# Patient Record
Sex: FEMALE | Race: WHITE | NOT HISPANIC OR LATINO | Employment: UNEMPLOYED | URBAN - METROPOLITAN AREA
[De-identification: names, ages, dates, MRNs, and addresses within clinical notes are randomized per-mention and may not be internally consistent; named-entity substitution may affect disease eponyms.]

---

## 2020-07-15 ENCOUNTER — OFFICE VISIT (OUTPATIENT)
Dept: OBSTETRICS AND GYNECOLOGY | Facility: CLINIC | Age: 45
End: 2020-07-15
Payer: COMMERCIAL

## 2020-07-15 VITALS
BODY MASS INDEX: 26.13 KG/M2 | HEIGHT: 62 IN | SYSTOLIC BLOOD PRESSURE: 121 MMHG | DIASTOLIC BLOOD PRESSURE: 72 MMHG | WEIGHT: 142 LBS

## 2020-07-15 DIAGNOSIS — Z12.31 BREAST CANCER SCREENING BY MAMMOGRAM: ICD-10-CM

## 2020-07-15 DIAGNOSIS — Z01.419 ENCOUNTER FOR WELL WOMAN EXAM WITH ROUTINE GYNECOLOGICAL EXAM: Primary | ICD-10-CM

## 2020-07-15 PROCEDURE — 3008F BODY MASS INDEX DOCD: CPT | Mod: CPTII,S$GLB,, | Performed by: OBSTETRICS & GYNECOLOGY

## 2020-07-15 PROCEDURE — 3008F PR BODY MASS INDEX (BMI) DOCUMENTED: ICD-10-PCS | Mod: CPTII,S$GLB,, | Performed by: OBSTETRICS & GYNECOLOGY

## 2020-07-15 PROCEDURE — 99396 PREV VISIT EST AGE 40-64: CPT | Mod: S$GLB,,, | Performed by: OBSTETRICS & GYNECOLOGY

## 2020-07-15 PROCEDURE — 99396 PR PREVENTIVE VISIT,EST,40-64: ICD-10-PCS | Mod: S$GLB,,, | Performed by: OBSTETRICS & GYNECOLOGY

## 2020-07-15 RX ORDER — NORGESTIMATE AND ETHINYL ESTRADIOL 0.25-0.035
KIT ORAL
COMMUNITY
Start: 2020-05-04 | End: 2020-07-15

## 2020-07-15 RX ORDER — SPIRONOLACTONE 100 MG/1
TABLET, FILM COATED ORAL
COMMUNITY
Start: 2020-05-27 | End: 2021-01-19 | Stop reason: SDUPTHER

## 2020-07-29 ENCOUNTER — TELEPHONE (OUTPATIENT)
Dept: OBSTETRICS AND GYNECOLOGY | Facility: CLINIC | Age: 45
End: 2020-07-29

## 2020-07-29 NOTE — TELEPHONE ENCOUNTER
----- Message from Yamilex Sanchez MD sent at 7/29/2020  1:31 PM CDT -----  Please let patient know her pap and hpv are normal

## 2020-07-30 ENCOUNTER — TELEPHONE (OUTPATIENT)
Dept: OBSTETRICS AND GYNECOLOGY | Facility: CLINIC | Age: 45
End: 2020-07-30

## 2020-07-30 NOTE — TELEPHONE ENCOUNTER
----- Message from Ambreen Billings sent at 7/30/2020  2:04 PM CDT -----  Type:  Patient Returning Call    Who Called:pt   Who Left Message for Patient:na  Does the patient know what this is regarding?:test results   Would the patient rather a call back or a response via [facility]? Callback   Best Call Back Number:[phone]   Additional Information:

## 2020-07-31 ENCOUNTER — TELEPHONE (OUTPATIENT)
Dept: OBSTETRICS AND GYNECOLOGY | Facility: CLINIC | Age: 45
End: 2020-07-31

## 2020-07-31 NOTE — TELEPHONE ENCOUNTER
----- Message from Noelle Garcia sent at 7/31/2020 10:02 AM CDT -----  Regarding: patient returning a call  Contact: patient  Type:  Patient Returning Call    Who Called:patient  Who Left Message for Patient:nurse  Does the patient know what this is regarding?:yes,results  Would the patient rather a call back or a response via [facility]? Call back  Best Call Back Number:[phone]   Additional Information: patient stated to leave a message if no answer

## 2020-10-09 PROBLEM — D72.829 LEUKOCYTOSIS (LEUCOCYTOSIS): Status: ACTIVE | Noted: 2020-10-09

## 2020-10-09 PROBLEM — E28.2 PCOD (POLYCYSTIC OVARIAN DISEASE): Status: ACTIVE | Noted: 2020-10-09

## 2020-10-15 ENCOUNTER — TELEPHONE (OUTPATIENT)
Dept: OBSTETRICS AND GYNECOLOGY | Facility: CLINIC | Age: 45
End: 2020-10-15

## 2020-10-15 NOTE — TELEPHONE ENCOUNTER
Spoke with patient. Two pt identifiers confirmed. Notified patient of results of normal mmg.. Patient verbalized understanding.

## 2020-10-15 NOTE — TELEPHONE ENCOUNTER
----- Message from Yamilex Sanchez MD sent at 10/13/2020  7:19 PM CDT -----  Please let patient know I reviewed her mmg report and it all looks normal

## 2020-10-15 NOTE — TELEPHONE ENCOUNTER
----- Message from Noelle Garcia sent at 10/15/2020 10:34 AM CDT -----  Regarding: patient returning a call  Contact: patient  Type:  Patient Returning Call    Who Called:patient  Who Left Message for Patient:nurse  Does the patient know what this is regarding?:yes,results  Would the patient rather a call back or a response via [facility]? Call back  Best Call Back Number:[phone]   Additional Information: n/a

## 2020-12-08 PROBLEM — E04.1 THYROID NODULE: Status: ACTIVE | Noted: 2020-12-08

## 2021-03-10 ENCOUNTER — OFFICE VISIT (OUTPATIENT)
Dept: PRIMARY CARE CLINIC | Facility: CLINIC | Age: 46
End: 2021-03-10
Payer: MEDICAID

## 2021-03-10 VITALS
WEIGHT: 151 LBS | DIASTOLIC BLOOD PRESSURE: 76 MMHG | BODY MASS INDEX: 27.79 KG/M2 | HEART RATE: 69 BPM | OXYGEN SATURATION: 99 % | SYSTOLIC BLOOD PRESSURE: 121 MMHG | TEMPERATURE: 98 F | HEIGHT: 62 IN

## 2021-03-10 DIAGNOSIS — E04.1 THYROID NODULE: ICD-10-CM

## 2021-03-10 DIAGNOSIS — Z00.00 WELLNESS EXAMINATION: ICD-10-CM

## 2021-03-10 DIAGNOSIS — D72.823 LEUKEMOID REACTION: Primary | ICD-10-CM

## 2021-03-10 DIAGNOSIS — E28.2 PCOD (POLYCYSTIC OVARIAN DISEASE): ICD-10-CM

## 2021-03-10 PROCEDURE — 99202 PR OFFICE/OUTPT VISIT, NEW, LEVL II, 15-29 MIN: ICD-10-PCS | Mod: S$GLB,,, | Performed by: INTERNAL MEDICINE

## 2021-03-10 PROCEDURE — 99202 OFFICE O/P NEW SF 15 MIN: CPT | Mod: S$GLB,,, | Performed by: INTERNAL MEDICINE

## 2021-08-04 PROBLEM — E28.2 PCOD (POLYCYSTIC OVARIAN DISEASE): Status: RESOLVED | Noted: 2020-10-09 | Resolved: 2021-08-04

## 2021-08-30 ENCOUNTER — OFFICE VISIT (OUTPATIENT)
Dept: PRIMARY CARE CLINIC | Facility: CLINIC | Age: 46
End: 2021-08-30
Payer: MEDICAID

## 2021-08-30 VITALS
OXYGEN SATURATION: 98 % | RESPIRATION RATE: 18 BRPM | SYSTOLIC BLOOD PRESSURE: 104 MMHG | DIASTOLIC BLOOD PRESSURE: 69 MMHG | HEART RATE: 74 BPM | HEIGHT: 62 IN | WEIGHT: 151 LBS | BODY MASS INDEX: 27.79 KG/M2

## 2021-08-30 DIAGNOSIS — D72.823 LEUKEMOID REACTION: Primary | ICD-10-CM

## 2021-08-30 DIAGNOSIS — E28.2 PCOD (POLYCYSTIC OVARIAN DISEASE): ICD-10-CM

## 2021-08-30 DIAGNOSIS — E04.1 THYROID NODULE: ICD-10-CM

## 2021-08-30 PROCEDURE — 99213 PR OFFICE/OUTPT VISIT, EST, LEVL III, 20-29 MIN: ICD-10-PCS | Mod: S$GLB,,, | Performed by: INTERNAL MEDICINE

## 2021-08-30 PROCEDURE — 99213 OFFICE O/P EST LOW 20 MIN: CPT | Mod: S$GLB,,, | Performed by: INTERNAL MEDICINE

## 2021-10-09 ENCOUNTER — IMMUNIZATION (OUTPATIENT)
Dept: HEMATOLOGY/ONCOLOGY | Facility: CLINIC | Age: 46
End: 2021-10-09
Payer: MEDICAID

## 2021-10-09 PROCEDURE — 90686 FLU VACCINE (QUAD) GREATER THAN OR EQUAL TO 3YO PRESERVATIVE FREE IM: ICD-10-PCS | Mod: S$GLB,,, | Performed by: FAMILY MEDICINE

## 2021-10-09 PROCEDURE — 90471 IMMUNIZATION ADMIN: CPT | Mod: S$GLB,,, | Performed by: FAMILY MEDICINE

## 2021-10-09 PROCEDURE — 90471 FLU VACCINE (QUAD) GREATER THAN OR EQUAL TO 3YO PRESERVATIVE FREE IM: ICD-10-PCS | Mod: S$GLB,,, | Performed by: FAMILY MEDICINE

## 2021-10-09 PROCEDURE — 90686 IIV4 VACC NO PRSV 0.5 ML IM: CPT | Mod: S$GLB,,, | Performed by: FAMILY MEDICINE

## 2022-02-17 ENCOUNTER — OFFICE VISIT (OUTPATIENT)
Dept: PRIMARY CARE CLINIC | Facility: CLINIC | Age: 47
End: 2022-02-17
Payer: MEDICAID

## 2022-02-17 VITALS
DIASTOLIC BLOOD PRESSURE: 79 MMHG | WEIGHT: 159.19 LBS | BODY MASS INDEX: 29.3 KG/M2 | OXYGEN SATURATION: 100 % | HEART RATE: 91 BPM | SYSTOLIC BLOOD PRESSURE: 123 MMHG | HEIGHT: 62 IN

## 2022-02-17 DIAGNOSIS — E89.0 POSTOPERATIVE HYPOTHYROIDISM: Primary | ICD-10-CM

## 2022-02-17 DIAGNOSIS — Z12.39 ENCOUNTER FOR SCREENING FOR MALIGNANT NEOPLASM OF BREAST, UNSPECIFIED SCREENING MODALITY: ICD-10-CM

## 2022-02-17 DIAGNOSIS — E87.5 HYPERKALEMIA: ICD-10-CM

## 2022-02-17 DIAGNOSIS — D72.823 LEUKEMOID REACTION: ICD-10-CM

## 2022-02-17 DIAGNOSIS — Z12.11 SCREENING FOR COLON CANCER: ICD-10-CM

## 2022-02-17 PROCEDURE — 3008F BODY MASS INDEX DOCD: CPT | Mod: CPTII,S$GLB,, | Performed by: INTERNAL MEDICINE

## 2022-02-17 PROCEDURE — 99214 PR OFFICE/OUTPT VISIT, EST, LEVL IV, 30-39 MIN: ICD-10-PCS | Mod: S$GLB,,, | Performed by: INTERNAL MEDICINE

## 2022-02-17 PROCEDURE — 3008F PR BODY MASS INDEX (BMI) DOCUMENTED: ICD-10-PCS | Mod: CPTII,S$GLB,, | Performed by: INTERNAL MEDICINE

## 2022-02-17 PROCEDURE — 99214 OFFICE O/P EST MOD 30 MIN: CPT | Mod: S$GLB,,, | Performed by: INTERNAL MEDICINE

## 2022-02-17 NOTE — PROGRESS NOTES
Subjective:      Patient ID: Alla Anguiano is a 46 y.o. female.    Chief Complaint: pt would like blood work and colon referral    HPI     Patient here for follow up. S/p right hemithyroidectomy. Needs recheck of her levels after starting levothyroxine. Denies acute complaints    Review of Systems   Constitutional: Negative for chills, fever, malaise/fatigue and weight loss.   Respiratory: Negative for cough, shortness of breath and wheezing.    Cardiovascular: Negative for chest pain, palpitations and leg swelling.   Gastrointestinal: Negative for abdominal pain, constipation, diarrhea, nausea and vomiting.   Genitourinary: Negative for dysuria, frequency and urgency.   Musculoskeletal: Negative for falls.   Neurological: Negative for dizziness, tingling and headaches.   Psychiatric/Behavioral: Negative for depression, substance abuse and suicidal ideas. The patient is not nervous/anxious.      Objective:     Physical Exam  Vitals reviewed.   Constitutional:       Appearance: Normal appearance. She is not ill-appearing.   HENT:      Head: Normocephalic.      Mouth/Throat:      Comments: Healed thyroidectomy scar  Eyes:      Extraocular Movements: Extraocular movements intact.      Conjunctiva/sclera: Conjunctivae normal.      Pupils: Pupils are equal, round, and reactive to light.   Cardiovascular:      Rate and Rhythm: Normal rate and regular rhythm.   Pulmonary:      Effort: Pulmonary effort is normal.      Breath sounds: Normal breath sounds.   Abdominal:      General: Bowel sounds are normal.   Musculoskeletal:         General: Normal range of motion.      Right lower leg: No edema.      Left lower leg: No edema.   Skin:     General: Skin is warm.      Capillary Refill: Capillary refill takes less than 2 seconds.   Neurological:      General: No focal deficit present.      Mental Status: She is alert and oriented to person, place, and time.   Psychiatric:         Mood and Affect: Mood normal.        BP  "123/79 (BP Location: Right arm, Patient Position: Sitting, BP Method: Medium (Manual))   Pulse 91   Ht 5' 2" (1.575 m)   Wt 72.2 kg (159 lb 3.2 oz)   SpO2 100%   BMI 29.12 kg/m²     Assessment:       ICD-10-CM ICD-9-CM   1. Leukemoid reaction  D72.823 288.62   2. Encounter for screening for malignant neoplasm of breast, unspecified screening modality  Z12.39 V76.10   3. Screening for colon cancer  Z12.11 V76.51   4. Postoperative hypothyroidism  E89.0 244.0   5. Hyperkalemia  E87.5 276.7       Plan:     Medication List with Changes/Refills   Current Medications    FLUCELVAX QUAD 0216-7438, PF, 60 MCG (15 MCG X 4)/0.5 ML SYRG    ADM 0.5ML IM UTD    LEVOTHYROXINE (SYNTHROID) 50 MCG TABLET    Take 1 tablet (50 mcg total) by mouth before breakfast.    MULTIVITAMIN CAPSULE    Take 1 capsule by mouth once daily.    SPIRONOLACTONE (ALDACTONE) 100 MG TABLET    Take 1 tablet (100 mg total) by mouth once daily.        Leukemoid reaction  -     CBC Auto Differential; Future; Expected date: 02/17/2022    Encounter for screening for malignant neoplasm of breast, unspecified screening modality  -     Mammo Digital Screening Bilat; Future; Expected date: 02/17/2022    Screening for colon cancer  -     Ambulatory referral/consult to General Surgery; Future; Expected date: 02/24/2022    Postoperative hypothyroidism  -     TSH; Future; Expected date: 02/17/2022  -     T4, Free; Future; Expected date: 02/17/2022    Hyperkalemia  -     Basic Metabolic Panel; Future; Expected date: 02/17/2022         Future Appointments   Date Time Provider Department Center   5/24/2022 10:20 AM Ena Villalta MD [facility]   8/30/2022  1:00 PM [facility]                      "

## 2022-02-18 LAB
BASOPHILS # BLD AUTO: 44 CELLS/UL (ref 0–200)
BASOPHILS NFR BLD AUTO: 0.4 %
BUN SERPL-MCNC: 11 MG/DL (ref 7–25)
BUN/CREAT SERPL: NORMAL (CALC) (ref 6–22)
CALCIUM SERPL-MCNC: 9.7 MG/DL (ref 8.6–10.2)
CHLORIDE SERPL-SCNC: 102 MMOL/L (ref 98–110)
CO2 SERPL-SCNC: 28 MMOL/L (ref 20–32)
CREAT SERPL-MCNC: 0.63 MG/DL (ref 0.5–1.1)
EOSINOPHIL # BLD AUTO: 100 CELLS/UL (ref 15–500)
EOSINOPHIL NFR BLD AUTO: 0.9 %
ERYTHROCYTE [DISTWIDTH] IN BLOOD BY AUTOMATED COUNT: 11.8 % (ref 11–15)
GLUCOSE SERPL-MCNC: 89 MG/DL (ref 65–99)
HCT VFR BLD AUTO: 44.7 % (ref 35–45)
HGB BLD-MCNC: 15.2 G/DL (ref 11.7–15.5)
LYMPHOCYTES # BLD AUTO: 1954 CELLS/UL (ref 850–3900)
LYMPHOCYTES NFR BLD AUTO: 17.6 %
MCH RBC QN AUTO: 30.4 PG (ref 27–33)
MCHC RBC AUTO-ENTMCNC: 34 G/DL (ref 32–36)
MCV RBC AUTO: 89.4 FL (ref 80–100)
MONOCYTES # BLD AUTO: 733 CELLS/UL (ref 200–950)
MONOCYTES NFR BLD AUTO: 6.6 %
NEUTROPHILS # BLD AUTO: 8270 CELLS/UL (ref 1500–7800)
NEUTROPHILS NFR BLD AUTO: 74.5 %
PLATELET # BLD AUTO: 271 THOUSAND/UL (ref 140–400)
PMV BLD REES-ECKER: 12.3 FL (ref 7.5–12.5)
POTASSIUM SERPL-SCNC: 4.1 MMOL/L (ref 3.5–5.3)
RBC # BLD AUTO: 5 MILLION/UL (ref 3.8–5.1)
SODIUM SERPL-SCNC: 137 MMOL/L (ref 135–146)
T4 FREE SERPL-MCNC: 1.1 NG/DL (ref 0.8–1.8)
TSH SERPL-ACNC: 5.99 MIU/L
WBC # BLD AUTO: 11.1 THOUSAND/UL (ref 3.8–10.8)

## 2022-02-21 ENCOUNTER — TELEPHONE (OUTPATIENT)
Dept: PRIMARY CARE CLINIC | Facility: CLINIC | Age: 47
End: 2022-02-21
Payer: MEDICAID

## 2022-02-21 NOTE — TELEPHONE ENCOUNTER
----- Message from Js Hein sent at 2/21/2022  9:52 AM CST -----  Contact: self  Pt would like to consult with nurse regarding recent test results and medication.  Pt would like to know if her thyroid medication need to change (dosage) or stay the same.  Please contact Alla Anguiano @ [phone].  Thanks/As

## 2022-02-22 ENCOUNTER — PATIENT MESSAGE (OUTPATIENT)
Dept: PRIMARY CARE CLINIC | Facility: CLINIC | Age: 47
End: 2022-02-22
Payer: MEDICAID

## 2022-02-24 LAB — BCS RECOMMENDATION EXT: NORMAL

## 2022-02-24 RX ORDER — LEVOTHYROXINE SODIUM 25 UG/1
25 TABLET ORAL
Qty: 30 TABLET | Refills: 11 | Status: SHIPPED | OUTPATIENT
Start: 2022-02-24 | End: 2022-04-11

## 2022-02-24 RX ORDER — LEVOTHYROXINE SODIUM 50 UG/1
50 TABLET ORAL
Qty: 90 TABLET | Refills: 3 | Status: SHIPPED | OUTPATIENT
Start: 2022-02-24 | End: 2022-04-11

## 2022-03-02 ENCOUNTER — PATIENT MESSAGE (OUTPATIENT)
Dept: PRIMARY CARE CLINIC | Facility: CLINIC | Age: 47
End: 2022-03-02
Payer: MEDICAID

## 2022-03-07 ENCOUNTER — OFFICE VISIT (OUTPATIENT)
Dept: SURGERY | Facility: CLINIC | Age: 47
End: 2022-03-07
Payer: MEDICAID

## 2022-03-07 VITALS — BODY MASS INDEX: 29.26 KG/M2 | HEIGHT: 62 IN | RESPIRATION RATE: 18 BRPM | WEIGHT: 159 LBS

## 2022-03-07 DIAGNOSIS — Z12.11 SCREENING FOR COLON CANCER: ICD-10-CM

## 2022-03-07 PROCEDURE — 3008F PR BODY MASS INDEX (BMI) DOCUMENTED: ICD-10-PCS | Mod: CPTII,S$GLB,, | Performed by: SURGERY

## 2022-03-07 PROCEDURE — 99202 OFFICE O/P NEW SF 15 MIN: CPT | Mod: S$GLB,,, | Performed by: SURGERY

## 2022-03-07 PROCEDURE — 1159F MED LIST DOCD IN RCRD: CPT | Mod: CPTII,S$GLB,, | Performed by: SURGERY

## 2022-03-07 PROCEDURE — 1160F PR REVIEW ALL MEDS BY PRESCRIBER/CLIN PHARMACIST DOCUMENTED: ICD-10-PCS | Mod: CPTII,S$GLB,, | Performed by: SURGERY

## 2022-03-07 PROCEDURE — 1159F PR MEDICATION LIST DOCUMENTED IN MEDICAL RECORD: ICD-10-PCS | Mod: CPTII,S$GLB,, | Performed by: SURGERY

## 2022-03-07 PROCEDURE — 3008F BODY MASS INDEX DOCD: CPT | Mod: CPTII,S$GLB,, | Performed by: SURGERY

## 2022-03-07 PROCEDURE — 99202 PR OFFICE/OUTPT VISIT, NEW, LEVL II, 15-29 MIN: ICD-10-PCS | Mod: S$GLB,,, | Performed by: SURGERY

## 2022-03-07 PROCEDURE — 1160F RVW MEDS BY RX/DR IN RCRD: CPT | Mod: CPTII,S$GLB,, | Performed by: SURGERY

## 2022-03-07 NOTE — PROGRESS NOTES
Patient here to discuss screening colonoscopy.  States that the she is not having any problems at this time with bleeding, constipation, or abdominal pain.  Does have a family history of her grandfather being diagnosed with colon cancer at the age of 60.  Patient has never had a colonoscopy in the past.    Risks and benefits of screening colonoscopy discussed in detail, patient will be scheduled for screening colonoscopy.

## 2022-03-18 ENCOUNTER — OUTSIDE PLACE OF SERVICE (OUTPATIENT)
Dept: SURGERY | Facility: CLINIC | Age: 47
End: 2022-03-18
Payer: MEDICAID

## 2022-03-18 LAB — CRC RECOMMENDATION EXT: NORMAL

## 2022-03-18 PROCEDURE — 45378 PR COLONOSCOPY,DIAGNOSTIC: ICD-10-PCS | Mod: ,,, | Performed by: SURGERY

## 2022-03-18 PROCEDURE — 45378 DIAGNOSTIC COLONOSCOPY: CPT | Mod: ,,, | Performed by: SURGERY

## 2022-03-21 ENCOUNTER — PATIENT OUTREACH (OUTPATIENT)
Dept: ADMINISTRATIVE | Facility: HOSPITAL | Age: 47
End: 2022-03-21
Payer: MEDICAID

## 2022-04-04 ENCOUNTER — PATIENT MESSAGE (OUTPATIENT)
Dept: PRIMARY CARE CLINIC | Facility: CLINIC | Age: 47
End: 2022-04-04
Payer: MEDICAID

## 2022-04-09 LAB
T4 FREE SERPL-MCNC: 1.2 NG/DL (ref 0.8–1.8)
TSH SERPL-ACNC: 6.44 MIU/L

## 2022-04-11 ENCOUNTER — PATIENT MESSAGE (OUTPATIENT)
Dept: PRIMARY CARE CLINIC | Facility: CLINIC | Age: 47
End: 2022-04-11
Payer: MEDICAID

## 2022-04-11 DIAGNOSIS — E89.0 POSTOPERATIVE HYPOTHYROIDISM: Primary | ICD-10-CM

## 2022-04-11 RX ORDER — LEVOTHYROXINE SODIUM 88 UG/1
88 TABLET ORAL
Qty: 30 TABLET | Refills: 11 | Status: SHIPPED | OUTPATIENT
Start: 2022-04-11 | End: 2022-06-01

## 2022-06-01 ENCOUNTER — PATIENT MESSAGE (OUTPATIENT)
Dept: PRIMARY CARE CLINIC | Facility: CLINIC | Age: 47
End: 2022-06-01
Payer: MEDICAID

## 2022-06-01 DIAGNOSIS — E89.0 POSTOPERATIVE HYPOTHYROIDISM: Primary | ICD-10-CM

## 2022-06-01 RX ORDER — LEVOTHYROXINE SODIUM 75 UG/1
75 TABLET ORAL
Qty: 30 TABLET | Refills: 11 | Status: SHIPPED | OUTPATIENT
Start: 2022-06-01 | End: 2023-05-24

## 2022-07-23 LAB
T4 FREE SERPL-MCNC: 1.2 NG/DL (ref 0.8–1.8)
TSH SERPL-ACNC: 0.45 MIU/L

## 2022-07-25 ENCOUNTER — PATIENT MESSAGE (OUTPATIENT)
Dept: PRIMARY CARE CLINIC | Facility: CLINIC | Age: 47
End: 2022-07-25
Payer: MEDICAID

## 2022-11-01 ENCOUNTER — PATIENT MESSAGE (OUTPATIENT)
Dept: PRIMARY CARE CLINIC | Facility: CLINIC | Age: 47
End: 2022-11-01
Payer: MEDICAID

## 2022-11-01 LAB
T4 FREE SERPL-MCNC: 1.3 NG/DL (ref 0.8–1.8)
TSH SERPL-ACNC: 1.59 MIU/L

## 2022-12-07 ENCOUNTER — OFFICE VISIT (OUTPATIENT)
Dept: PRIMARY CARE CLINIC | Facility: CLINIC | Age: 47
End: 2022-12-07
Payer: MEDICAID

## 2022-12-07 VITALS
BODY MASS INDEX: 27.82 KG/M2 | WEIGHT: 151.19 LBS | OXYGEN SATURATION: 100 % | HEART RATE: 73 BPM | HEIGHT: 62 IN | DIASTOLIC BLOOD PRESSURE: 82 MMHG | SYSTOLIC BLOOD PRESSURE: 130 MMHG

## 2022-12-07 DIAGNOSIS — E66.3 OVERWEIGHT (BMI 25.0-29.9): ICD-10-CM

## 2022-12-07 DIAGNOSIS — Z00.00 WELLNESS EXAMINATION: ICD-10-CM

## 2022-12-07 DIAGNOSIS — E89.0 POSTOPERATIVE HYPOTHYROIDISM: Primary | ICD-10-CM

## 2022-12-07 PROCEDURE — 3008F BODY MASS INDEX DOCD: CPT | Mod: CPTII,S$GLB,, | Performed by: INTERNAL MEDICINE

## 2022-12-07 PROCEDURE — 3079F PR MOST RECENT DIASTOLIC BLOOD PRESSURE 80-89 MM HG: ICD-10-PCS | Mod: CPTII,S$GLB,, | Performed by: INTERNAL MEDICINE

## 2022-12-07 PROCEDURE — 99213 PR OFFICE/OUTPT VISIT, EST, LEVL III, 20-29 MIN: ICD-10-PCS | Mod: S$GLB,,, | Performed by: INTERNAL MEDICINE

## 2022-12-07 PROCEDURE — 3075F SYST BP GE 130 - 139MM HG: CPT | Mod: CPTII,S$GLB,, | Performed by: INTERNAL MEDICINE

## 2022-12-07 PROCEDURE — 1159F PR MEDICATION LIST DOCUMENTED IN MEDICAL RECORD: ICD-10-PCS | Mod: CPTII,S$GLB,, | Performed by: INTERNAL MEDICINE

## 2022-12-07 PROCEDURE — 1159F MED LIST DOCD IN RCRD: CPT | Mod: CPTII,S$GLB,, | Performed by: INTERNAL MEDICINE

## 2022-12-07 PROCEDURE — 99213 OFFICE O/P EST LOW 20 MIN: CPT | Mod: S$GLB,,, | Performed by: INTERNAL MEDICINE

## 2022-12-07 PROCEDURE — 3075F PR MOST RECENT SYSTOLIC BLOOD PRESS GE 130-139MM HG: ICD-10-PCS | Mod: CPTII,S$GLB,, | Performed by: INTERNAL MEDICINE

## 2022-12-07 PROCEDURE — 3079F DIAST BP 80-89 MM HG: CPT | Mod: CPTII,S$GLB,, | Performed by: INTERNAL MEDICINE

## 2022-12-07 PROCEDURE — 3008F PR BODY MASS INDEX (BMI) DOCUMENTED: ICD-10-PCS | Mod: CPTII,S$GLB,, | Performed by: INTERNAL MEDICINE

## 2022-12-07 NOTE — PROGRESS NOTES
Subjective:      Patient ID: Alla Anguiano is a 47 y.o. female.    Chief Complaint: Annual Exam    HPI    Past Medical History:   Diagnosis Date    Allergies     Elevated WBC count     Excess body and facial hair     Nodule of right lobe of thyroid gland 2020    PCOS (polycystic ovarian syndrome)        Patient here for follow up. No acute complaints   Thyroid levels have been stable  Cycles are regular and she is going to schedule an appointment with her gynecologist. She will be due for a mammogram in Feb  She is otherwise up to date on her health maintenance      Review of Systems   Constitutional:  Negative for chills and fever.   HENT:  Negative for hearing loss.    Eyes:  Negative for blurred vision.   Respiratory:  Negative for cough, shortness of breath and wheezing.    Cardiovascular:  Negative for chest pain, palpitations and leg swelling.   Gastrointestinal:  Negative for abdominal pain, constipation, diarrhea, nausea and vomiting.   Genitourinary:  Negative for dysuria, frequency and urgency.   Musculoskeletal:  Negative for falls.   Skin:  Negative for rash.   Neurological:  Negative for dizziness and headaches.   Psychiatric/Behavioral:  Negative for depression. The patient is not nervous/anxious.    Objective:     Physical Exam  Vitals reviewed.   Constitutional:       Appearance: Normal appearance.   HENT:      Head: Normocephalic.   Eyes:      Extraocular Movements: Extraocular movements intact.      Conjunctiva/sclera: Conjunctivae normal.      Pupils: Pupils are equal, round, and reactive to light.   Cardiovascular:      Rate and Rhythm: Normal rate and regular rhythm.   Pulmonary:      Effort: Pulmonary effort is normal.      Breath sounds: Normal breath sounds.   Abdominal:      General: Bowel sounds are normal.   Skin:     General: Skin is warm.      Capillary Refill: Capillary refill takes less than 2 seconds.   Neurological:      General: No focal deficit present.      Mental Status: She  "is alert and oriented to person, place, and time.   Psychiatric:         Mood and Affect: Mood normal.     /82 (BP Location: Right arm, Patient Position: Sitting, BP Method: Medium (Automatic))   Pulse 73   Ht 5' 2" (1.575 m)   Wt 68.6 kg (151 lb 3.2 oz)   SpO2 100%   BMI 27.65 kg/m²     Assessment:       ICD-10-CM ICD-9-CM   1. Postoperative hypothyroidism  E89.0 244.0   2. Wellness examination  Z00.00 V70.0   3. Overweight (BMI 25.0-29.9)  E66.3 278.02       Plan:     Medication List with Changes/Refills   Current Medications    FLUCELVAX QUAD 5816-0291, PF, 60 MCG (15 MCG X 4)/0.5 ML SYRG    ADM 0.5ML IM UTD    LEVOTHYROXINE (SYNTHROID) 75 MCG TABLET    Take 1 tablet (75 mcg total) by mouth before breakfast.    MULTIVITAMIN CAPSULE    Take 1 capsule by mouth once daily.    SPIRONOLACTONE (ALDACTONE) 100 MG TABLET    Take 1 tablet (100 mg total) by mouth once daily.        1. Postoperative hypothyroidism  -     TSH; Future; Expected date: 12/07/2022  -     T4, Free; Future; Expected date: 12/07/2022    2. Wellness examination  -     CBC Auto Differential; Future; Expected date: 12/07/2022  -     Basic Metabolic Panel; Future; Expected date: 12/07/2022  -     TSH; Future; Expected date: 12/07/2022  -     T4, Free; Future; Expected date: 12/07/2022    3. Overweight (BMI 25.0-29.9)       Labs to be done before her visit with Endocrinology     RTC sooner if needed     Schedule appointment with gynecology for well woman exam     Future Appointments   Date Time Provider Department Center   5/30/2023 11:00 AM Ena Villalta MD [facility]   6/7/2023  8:20 AM Charmaine Naranjo MD [facility] Torib[facility]                  "

## 2023-05-23 ENCOUNTER — CLINICAL SUPPORT (OUTPATIENT)
Dept: PRIMARY CARE CLINIC | Facility: CLINIC | Age: 48
End: 2023-05-23
Payer: COMMERCIAL

## 2023-05-23 ENCOUNTER — TELEPHONE (OUTPATIENT)
Dept: PRIMARY CARE CLINIC | Facility: CLINIC | Age: 48
End: 2023-05-23

## 2023-05-23 DIAGNOSIS — E89.0 POSTOPERATIVE HYPOTHYROIDISM: ICD-10-CM

## 2023-05-23 DIAGNOSIS — Z01.89 ROUTINE LAB DRAW: Primary | ICD-10-CM

## 2023-05-23 LAB
ABS NRBC COUNT: 0 THOU/UL (ref 0–0.01)
ABSOLUTE BASOPHIL: 0.1 10*3/UL (ref 0–0.3)
ABSOLUTE EOSINOPHIL: 0.1 10*3/UL (ref 0–0.6)
ABSOLUTE IMMATURE GRAN: 0.02 THOU/UL (ref 0–0.03)
ABSOLUTE LYMPHOCYTE: 2.1 10*3/UL (ref 1.2–4)
ABSOLUTE MONOCYTE: 0.8 10*3/UL (ref 0.1–0.8)
ANION GAP SERPL CALC-SCNC: 5 MMOL/L (ref 3–11)
BASOPHILS NFR BLD: 0.6 % (ref 0–3)
BUN SERPL-MCNC: 11 MG/DL (ref 7–18)
BUN/CREAT SERPL: 15.27 RATIO
CALCIUM SERPL-MCNC: 9.7 MG/DL (ref 8.5–10.1)
CHLORIDE SERPL-SCNC: 107 MMOL/L (ref 98–107)
CO2 SERPL-SCNC: 24 MMOL/L (ref 21–32)
CREAT SERPL-MCNC: 0.72 MG/DL (ref 0.55–1.02)
EOSINOPHIL NFR BLD: 0.5 % (ref 0–6)
ERYTHROCYTE [DISTWIDTH] IN BLOOD BY AUTOMATED COUNT: 12.1 % (ref 0–15.5)
GFR ESTIMATION: > 60
GLUCOSE SERPL-MCNC: 98 MG/DL (ref 74–106)
HCT VFR BLD AUTO: 43.8 % (ref 37–47)
HGB BLD-MCNC: 14.7 G/DL (ref 12–16)
IMMATURE GRANULOCYTES: 0.2 % (ref 0–0.43)
LYMPHOCYTES NFR BLD: 18.7 % (ref 20–45)
MCH RBC QN AUTO: 29.3 PG (ref 27–32)
MCHC RBC AUTO-ENTMCNC: 33.6 % (ref 32–36)
MCV RBC AUTO: 87.4 FL (ref 80–99)
MONOCYTES NFR BLD: 7.5 % (ref 2–10)
NEUTROPHILS # BLD AUTO: 8 10*3/UL (ref 1.4–7)
NEUTROPHILS NFR BLD: 72.5 % (ref 50–80)
NUCLEATED RED BLOOD CELLS: 0 % (ref 0–0.2)
PLATELETS: 296 10*3/UL (ref 130–400)
PMV BLD AUTO: 12.5 FL (ref 9.2–12.2)
POTASSIUM SERPL-SCNC: 4.2 MMOL/L (ref 3.5–5.1)
RBC # BLD AUTO: 5.01 10*6/UL (ref 4.2–5.4)
SODIUM BLD-SCNC: 136 MMOL/L (ref 131–143)
T4 FREE SP9 P CHAL SERPL-SCNC: 1.21 NG/DL (ref 0.76–1.46)
TSH SERPL DL<=0.005 MIU/L-ACNC: 2.05 UIU/ML (ref 0.36–3.74)
WBC # BLD: 11 10*3/UL (ref 4.5–10)

## 2023-05-23 PROCEDURE — 36415 COLL VENOUS BLD VENIPUNCTURE: CPT | Mod: S$GLB,,, | Performed by: INTERNAL MEDICINE

## 2023-05-23 PROCEDURE — 36415 PR COLLECTION VENOUS BLOOD,VENIPUNCTURE: ICD-10-PCS | Mod: S$GLB,,, | Performed by: INTERNAL MEDICINE

## 2023-05-23 RX ORDER — LEVOTHYROXINE SODIUM 75 UG/1
75 TABLET ORAL
Qty: 30 TABLET | Refills: 11 | Status: CANCELLED | OUTPATIENT
Start: 2023-05-23 | End: 2024-05-22

## 2023-05-24 RX ORDER — LEVOTHYROXINE SODIUM 75 UG/1
TABLET ORAL
Qty: 30 TABLET | Refills: 11 | Status: SHIPPED | OUTPATIENT
Start: 2023-05-24 | End: 2024-04-02 | Stop reason: SDUPTHER

## 2023-05-30 ENCOUNTER — PATIENT MESSAGE (OUTPATIENT)
Dept: PRIMARY CARE CLINIC | Facility: CLINIC | Age: 48
End: 2023-05-30
Payer: COMMERCIAL

## 2023-06-20 ENCOUNTER — OFFICE VISIT (OUTPATIENT)
Dept: PRIMARY CARE CLINIC | Facility: CLINIC | Age: 48
End: 2023-06-20
Payer: COMMERCIAL

## 2023-06-20 VITALS
SYSTOLIC BLOOD PRESSURE: 129 MMHG | BODY MASS INDEX: 28.47 KG/M2 | WEIGHT: 154.69 LBS | OXYGEN SATURATION: 99 % | DIASTOLIC BLOOD PRESSURE: 69 MMHG | RESPIRATION RATE: 18 BRPM | HEART RATE: 86 BPM | HEIGHT: 62 IN

## 2023-06-20 DIAGNOSIS — Z00.00 WELLNESS EXAMINATION: Primary | ICD-10-CM

## 2023-06-20 DIAGNOSIS — R92.8 ABNORMAL SCREENING MAMMOGRAM: Primary | ICD-10-CM

## 2023-06-20 DIAGNOSIS — E89.0 POSTOPERATIVE HYPOTHYROIDISM: ICD-10-CM

## 2023-06-20 DIAGNOSIS — E28.2 PCOD (POLYCYSTIC OVARIAN DISEASE): ICD-10-CM

## 2023-06-20 DIAGNOSIS — E66.3 OVERWEIGHT (BMI 25.0-29.9): ICD-10-CM

## 2023-06-20 PROCEDURE — 3008F PR BODY MASS INDEX (BMI) DOCUMENTED: ICD-10-PCS | Mod: CPTII,S$GLB,, | Performed by: INTERNAL MEDICINE

## 2023-06-20 PROCEDURE — 1159F MED LIST DOCD IN RCRD: CPT | Mod: CPTII,S$GLB,, | Performed by: INTERNAL MEDICINE

## 2023-06-20 PROCEDURE — 99213 PR OFFICE/OUTPT VISIT, EST, LEVL III, 20-29 MIN: ICD-10-PCS | Mod: S$GLB,,, | Performed by: INTERNAL MEDICINE

## 2023-06-20 PROCEDURE — 3074F PR MOST RECENT SYSTOLIC BLOOD PRESSURE < 130 MM HG: ICD-10-PCS | Mod: CPTII,S$GLB,, | Performed by: INTERNAL MEDICINE

## 2023-06-20 PROCEDURE — 1159F PR MEDICATION LIST DOCUMENTED IN MEDICAL RECORD: ICD-10-PCS | Mod: CPTII,S$GLB,, | Performed by: INTERNAL MEDICINE

## 2023-06-20 PROCEDURE — 3008F BODY MASS INDEX DOCD: CPT | Mod: CPTII,S$GLB,, | Performed by: INTERNAL MEDICINE

## 2023-06-20 PROCEDURE — 3078F PR MOST RECENT DIASTOLIC BLOOD PRESSURE < 80 MM HG: ICD-10-PCS | Mod: CPTII,S$GLB,, | Performed by: INTERNAL MEDICINE

## 2023-06-20 PROCEDURE — 99213 OFFICE O/P EST LOW 20 MIN: CPT | Mod: S$GLB,,, | Performed by: INTERNAL MEDICINE

## 2023-06-20 PROCEDURE — 3074F SYST BP LT 130 MM HG: CPT | Mod: CPTII,S$GLB,, | Performed by: INTERNAL MEDICINE

## 2023-06-20 PROCEDURE — 3078F DIAST BP <80 MM HG: CPT | Mod: CPTII,S$GLB,, | Performed by: INTERNAL MEDICINE

## 2023-06-20 NOTE — PROGRESS NOTES
Subjective:      Patient ID: Alla Anguiano is a 47 y.o. female.    Chief Complaint: Follow-up    HPI    Past Medical History:   Diagnosis Date    Allergies     Elevated WBC count     Excess body and facial hair     Nodule of right lobe of thyroid gland 2020    PCOS (polycystic ovarian syndrome)          Patient here for follow up. She has a h/o hemithyroidectomy and her levels are stable on current dose. She sees an Endocrinologist in [place] yearly   She denies any acute complaints  She is scheduled for her mammogram today  She denies sexual activity        Review of Systems   Constitutional:  Negative for chills and fever.   HENT:  Negative for hearing loss.    Eyes:  Negative for blurred vision.   Respiratory:  Negative for cough, shortness of breath and wheezing.    Cardiovascular:  Negative for chest pain, palpitations and leg swelling.   Gastrointestinal:  Negative for abdominal pain, blood in stool, constipation, diarrhea, melena, nausea and vomiting.   Genitourinary:  Negative for dysuria, frequency and urgency.   Musculoskeletal:  Negative for falls.   Skin:  Negative for rash.   Neurological:  Negative for dizziness and headaches.   Endo/Heme/Allergies:  Does not bruise/bleed easily.   Psychiatric/Behavioral:  Negative for depression. The patient is not nervous/anxious.    Objective:     Physical Exam  Vitals reviewed.   Constitutional:       Appearance: Normal appearance.   HENT:      Head: Normocephalic.      Mouth/Throat:      Mouth: Mucous membranes are moist.      Pharynx: Oropharynx is clear.   Eyes:      Extraocular Movements: Extraocular movements intact.      Conjunctiva/sclera: Conjunctivae normal.      Pupils: Pupils are equal, round, and reactive to light.   Cardiovascular:      Rate and Rhythm: Normal rate and regular rhythm.   Pulmonary:      Effort: Pulmonary effort is normal.      Breath sounds: Normal breath sounds.   Abdominal:      General: Bowel sounds are normal.  "  Musculoskeletal:      Right lower leg: No edema.      Left lower leg: No edema.   Skin:     General: Skin is warm.      Capillary Refill: Capillary refill takes less than 2 seconds.   Neurological:      General: No focal deficit present.      Mental Status: She is alert.   Psychiatric:         Mood and Affect: Mood normal.   /69 (BP Location: Right arm, Patient Position: Sitting, BP Method: Medium (Automatic))   Pulse 86   Resp 18   Ht 5' 2" (1.575 m)   Wt 70.2 kg (154 lb 11.2 oz)   LMP  (LMP Unknown)   SpO2 99%   BMI 28.30 kg/m²     Assessment:       ICD-10-CM ICD-9-CM   1. Wellness examination  Z00.00 V70.0   2. Postoperative hypothyroidism  E89.0 244.0   3. Overweight (BMI 25.0-29.9)  E66.3 278.02   4. PCOD (polycystic ovarian disease)  E28.2 256.4       Plan:     Medication List with Changes/Refills   Current Medications    FLUCELVAX QUAD 9602-5255, PF, 60 MCG (15 MCG X 4)/0.5 ML SYRG    ADM 0.5ML IM UTD    LEVOTHYROXINE (SYNTHROID) 75 MCG TABLET    TAKE 1 TABLET(75 MCG) BY MOUTH DAILY BEFORE BREAKFAST    MULTIVITAMIN CAPSULE    Take 1 capsule by mouth once daily.    SPIRONOLACTONE (ALDACTONE) 100 MG TABLET    TAKE 1 TABLET(100 MG) BY MOUTH EVERY DAY        1. Wellness examination    2. Postoperative hypothyroidism    3. Overweight (BMI 25.0-29.9)    4. PCOD (polycystic ovarian disease)         Labs reviewed       Future Appointments   Date Time Provider Department Center   8/8/2023  2:00 PM Yamilex Sanchez MD [id number] [facility]   1/9/2024 11:20 AM Charmaine Naranjo MD [facility] [id number] [facility] [place]   6/4/2024 11:00 AM Ena Villalta MD [facility]                "

## 2023-06-20 NOTE — PROGRESS NOTES
Please let pt know her mmg shows an area of asymmetry in the right breast that they are requesting some additional views. I placed order  please fax

## 2023-07-11 ENCOUNTER — TELEPHONE (OUTPATIENT)
Dept: OBSTETRICS AND GYNECOLOGY | Facility: CLINIC | Age: 48
End: 2023-07-11
Payer: COMMERCIAL

## 2023-07-11 DIAGNOSIS — R92.8 ABNORMAL SCREENING MAMMOGRAM: Primary | ICD-10-CM

## 2023-07-11 NOTE — TELEPHONE ENCOUNTER
Spoke with patient. Informed her of her ultrasound result, and need for a biopsy. Patient verbalized understanding. She agrees to have a referral sent to Dr. Clifton to have the biopsy done. Will order referral and fax to Dr. Clifton's office.

## 2023-07-11 NOTE — TELEPHONE ENCOUNTER
"----- Message from Yamilex Sanchez MD sent at 7/11/2023 10:44 AM CDT -----  Please let pt know that radiology is recommending a biopsy of the area in her right breast. They commented that her breasts are "extremely dense" which makes it that much harder to see anything abnormal. So that really limits what they can see on mmg and they often have to do a biopsy to determine what exactly they are looking at. I am placing ref to ana  "

## 2023-07-11 NOTE — PROGRESS NOTES
"Please let pt know that radiology is recommending a biopsy of the area in her right breast. They commented that her breasts are "extremely dense" which makes it that much harder to see anything abnormal. So that really limits what they can see on mmg and they often have to do a biopsy to determine what exactly they are looking at. I am placing ref to ana"

## 2023-12-04 NOTE — PROGRESS NOTES
CC: WELL WOMAN (age 45-59)-perimenopausal  Patient Care Team:  Charmaine Naranjo MD as PCP - General (Pediatrics)  Ama Clifton MD as Consulting Physician (General Surgery)    HPI:  Patient is a 48 y.o. who presents for her well woman exam today.  History reviewed with patient. Seeing Dr Clifton for her breast issues as she has a family hx and had a bx in July that was a fibroadenoma but still watching another spot near it.  Patient is without complaints or concerns today. Cycles mostly once a month but sometimes has a second one. Cycles light and last up to 5 days     NEW PATIENT       CONTRACEPTION:  none  HX ABNL PAPS: none    REVIEW OF PRIOR DATA/ HEALTH MAINTENANCE:  LAST ANNUAL:     OR BEFORE    LAST MMG (screening)- 2023-  at [facility]-bx fibroadenoma  LAST LABS- does with PCP   LAST COLONOSCOPY- - [facility] - q 10 yrs (neg)     Past Medical History:   Diagnosis Date    Allergies     Elevated WBC count     Excess body and facial hair     Nodule of right lobe of thyroid gland     PCOS (polycystic ovarian syndrome)      SURGICAL HX:   has a past surgical history that includes Eye surgery; Mouth surgery; Tympanostomy tube placement; Biopsy of thyroid (); Adenoidectomy (); Tonsillectomy (); Thyroid lobectomy (Right, 2021); and Colonoscopy (2022).    SOCIAL HX:    reports that she has never smoked. She has never used smokeless tobacco. She reports that she does not currently use alcohol. She reports that she does not use drugs.    FAMILY HX:   family history includes Breast cancer in her maternal grandmother; Cancer in her maternal grandfather; Colon cancer in her paternal grandfather; Eating disorder in her mother; Heart disease in her father; Hypertension in her father and mother; Pacemaker/defibrilator in her father; Vision loss in her mother. .    ALLERGIES:  Patient has no known allergies.    Current Outpatient Medications   Medication Instructions     "FLUCELVAX QUAD 3137-4129, PF, 60 mcg (15 mcg x 4)/0.5 mL Syrg ADM 0.5ML IM UTD    levothyroxine (SYNTHROID) 75 MCG tablet TAKE 1 TABLET(75 MCG) BY MOUTH DAILY BEFORE BREAKFAST    multivitamin capsule 1 capsule, Oral, Daily    spironolactone (ALDACTONE) 100 MG tablet TAKE 1 TABLET(100 MG) BY MOUTH EVERY DAY     ROS:  CONST:  No fever, chills, fatigue or unexpected changes in weight   CV: No chest pain or palpitations   RESP:  No shortness of breath or cough   GI: No abd pain, vomiting, diarrhea, blood in stool, or changes in bowel mvmts   SKIN: No rashes or lesions  MUSCULOSKELETAL: No joint swelling or pain   PSYCH: No changes in mood or insomia   BREASTS: No asymmetry, lumps, pain, nipple discharge, or skin changes   :  No dysuria, urgency, frequency, hematuria or incontinence           No vag dc, itching, odor or dryness           No pelvic pain, dyspareunia, or abnormal vaginal bleeding     VITALS:  Height 5' 2" (1.575 m), weight 68 kg (150 lb), last menstrual period 11/28/2023.  Body mass index is 27.44 kg/m².     PHYSICAL EXAM-  APPEARANCE: Well appearing, in no acute distress.   NECK: Neck symmetric.   CV:  Normal rate   PULM: Normal resp rate, no resp distress, normal resp effort    PSYCH: Normal mood and affect, cooperative   SKIN: No rashes, lesions, or abnormal bruising   LYMPH: No inguinal or axillary adenopathy   ABD: Soft, without tenderness or masses.   BREAST: Symmetrical, no nipple changes, no skin changes, No palpable masses   PELVIC:  VULVA: No lesions. Normal female genitalia.  URETHRAL MEATUS: No masses, no significant prolapse.   BLADDER/ URETHRA: No masses or suprapubic tenderness   VAGINA: No lesions or erythema, No discharge.   CVX: No lesions,no cervical motion tenderness.   UTERUS: Normal size/shape, mobile, non-tender   ADNEXA: No masses, tenderness, or fullness.   ANUS/ PERINEUM: Normal tone.  No lesions.     *female chaperone present for entire exam    ASSESSMENT and PLAN:  Encounter " for well woman exam with routine gynecological exam  -     Liquid-based pap smear, screening    Breast cancer screening by mammogram  -     Mammo Digital Screening Bilat w/ Vincenzo; Future; Expected date: 12/18/2023       FOLLOWUP:  1 year for wwe or sooner prn    COUNSELING:  Patient was counseled today on recommendation for yearly pelvic exam, current Pap guidelines, self breast exams, contraception, recommendations for annual screening mammograms, and routine screening colonoscopy at age 46.  Encouraged patient to see her PCP for other health maintenance.

## 2023-12-05 ENCOUNTER — OFFICE VISIT (OUTPATIENT)
Dept: OBSTETRICS AND GYNECOLOGY | Facility: CLINIC | Age: 48
End: 2023-12-05
Payer: COMMERCIAL

## 2023-12-05 VITALS — BODY MASS INDEX: 27.6 KG/M2 | HEIGHT: 62 IN | WEIGHT: 150 LBS

## 2023-12-05 DIAGNOSIS — Z12.31 BREAST CANCER SCREENING BY MAMMOGRAM: ICD-10-CM

## 2023-12-05 DIAGNOSIS — Z01.419 ENCOUNTER FOR WELL WOMAN EXAM WITH ROUTINE GYNECOLOGICAL EXAM: Primary | ICD-10-CM

## 2023-12-05 PROCEDURE — 3008F BODY MASS INDEX DOCD: CPT | Mod: CPTII,S$GLB,, | Performed by: OBSTETRICS & GYNECOLOGY

## 2023-12-05 PROCEDURE — 99386 PREV VISIT NEW AGE 40-64: CPT | Mod: S$GLB,,, | Performed by: OBSTETRICS & GYNECOLOGY

## 2023-12-05 PROCEDURE — 1159F PR MEDICATION LIST DOCUMENTED IN MEDICAL RECORD: ICD-10-PCS | Mod: CPTII,S$GLB,, | Performed by: OBSTETRICS & GYNECOLOGY

## 2023-12-05 PROCEDURE — 1159F MED LIST DOCD IN RCRD: CPT | Mod: CPTII,S$GLB,, | Performed by: OBSTETRICS & GYNECOLOGY

## 2023-12-05 PROCEDURE — 99386 PR PREVENTIVE VISIT,NEW,40-64: ICD-10-PCS | Mod: S$GLB,,, | Performed by: OBSTETRICS & GYNECOLOGY

## 2023-12-05 PROCEDURE — 3008F PR BODY MASS INDEX (BMI) DOCUMENTED: ICD-10-PCS | Mod: CPTII,S$GLB,, | Performed by: OBSTETRICS & GYNECOLOGY

## 2023-12-07 LAB — Lab: NORMAL

## 2024-04-02 ENCOUNTER — OFFICE VISIT (OUTPATIENT)
Dept: PRIMARY CARE CLINIC | Facility: CLINIC | Age: 49
End: 2024-04-02
Payer: COMMERCIAL

## 2024-04-02 ENCOUNTER — CLINICAL SUPPORT (OUTPATIENT)
Dept: OBSTETRICS AND GYNECOLOGY | Facility: CLINIC | Age: 49
End: 2024-04-02
Payer: COMMERCIAL

## 2024-04-02 VITALS
SYSTOLIC BLOOD PRESSURE: 139 MMHG | HEART RATE: 98 BPM | WEIGHT: 152 LBS | DIASTOLIC BLOOD PRESSURE: 71 MMHG | BODY MASS INDEX: 27.97 KG/M2 | OXYGEN SATURATION: 99 % | HEIGHT: 62 IN

## 2024-04-02 DIAGNOSIS — Z00.00 WELLNESS EXAMINATION: ICD-10-CM

## 2024-04-02 DIAGNOSIS — Z01.89 ROUTINE LAB DRAW: Primary | ICD-10-CM

## 2024-04-02 DIAGNOSIS — E89.0 POSTOPERATIVE HYPOTHYROIDISM: Primary | ICD-10-CM

## 2024-04-02 PROCEDURE — 3078F DIAST BP <80 MM HG: CPT | Mod: CPTII,S$GLB,, | Performed by: INTERNAL MEDICINE

## 2024-04-02 PROCEDURE — 3075F SYST BP GE 130 - 139MM HG: CPT | Mod: CPTII,S$GLB,, | Performed by: INTERNAL MEDICINE

## 2024-04-02 PROCEDURE — 1159F MED LIST DOCD IN RCRD: CPT | Mod: CPTII,S$GLB,, | Performed by: INTERNAL MEDICINE

## 2024-04-02 PROCEDURE — 3008F BODY MASS INDEX DOCD: CPT | Mod: CPTII,S$GLB,, | Performed by: INTERNAL MEDICINE

## 2024-04-02 PROCEDURE — 99214 OFFICE O/P EST MOD 30 MIN: CPT | Mod: S$GLB,,, | Performed by: INTERNAL MEDICINE

## 2024-04-02 PROCEDURE — 36415 COLL VENOUS BLD VENIPUNCTURE: CPT | Mod: S$GLB,,, | Performed by: INTERNAL MEDICINE

## 2024-04-02 RX ORDER — LEVOTHYROXINE SODIUM 75 UG/1
TABLET ORAL
Qty: 30 TABLET | Refills: 11 | Status: SHIPPED | OUTPATIENT
Start: 2024-04-02

## 2024-04-02 NOTE — PROGRESS NOTES
Subjective:      Patient ID: Alla Anguiano is a 48 y.o. female.    Chief Complaint: Follow-up (She would like to have labs completed before she see's Dr Teran. ) and Medication Refill    HPI    Past Medical History:   Diagnosis Date    Allergies     Elevated WBC count     Excess body and facial hair     Nodule of right lobe of thyroid gland 2020    PCOS (polycystic ovarian syndrome)      Patient here for follow up. She has a h/o hemithyroidectomy and her levels are stable on current dose. She sees an Endocrinologist in [place] yearly   She denies any acute complaints  She denies sexual activity  Has chronic elevated white blood cell count and has had extensive workup by a hematologist in the past  She is on spironolactone for excess body and facial hair  She is established with Dr. Clifton who did a biopsy of a mass on her breast it is benign however she is keeping an eye on it      Review of Systems   Constitutional:  Negative for chills and fever.   HENT:  Negative for hearing loss.    Eyes:  Negative for blurred vision.   Respiratory:  Negative for cough, shortness of breath and wheezing.    Cardiovascular:  Negative for chest pain, palpitations and leg swelling.   Gastrointestinal:  Negative for abdominal pain, blood in stool, constipation, diarrhea, melena, nausea and vomiting.   Genitourinary:  Negative for dysuria, frequency and urgency.   Musculoskeletal:  Negative for falls.   Skin:  Negative for rash.   Neurological:  Negative for dizziness and headaches.   Endo/Heme/Allergies:  Does not bruise/bleed easily.   Psychiatric/Behavioral:  Negative for depression. The patient is not nervous/anxious.      Objective:     Physical Exam  Vitals reviewed.   Constitutional:       Appearance: Normal appearance.   HENT:      Head: Normocephalic.      Mouth/Throat:      Mouth: Mucous membranes are moist.      Pharynx: Oropharynx is clear.   Eyes:      Extraocular Movements: Extraocular movements intact.      " Conjunctiva/sclera: Conjunctivae normal.      Pupils: Pupils are equal, round, and reactive to light.   Cardiovascular:      Rate and Rhythm: Normal rate and regular rhythm.   Pulmonary:      Effort: Pulmonary effort is normal.      Breath sounds: Normal breath sounds.   Abdominal:      General: Bowel sounds are normal.   Musculoskeletal:      Right lower leg: No edema.      Left lower leg: No edema.   Skin:     General: Skin is warm.      Capillary Refill: Capillary refill takes less than 2 seconds.   Neurological:      Mental Status: She is alert and oriented to person, place, and time.   Psychiatric:         Mood and Affect: Mood normal.       /71 (BP Location: Right arm, Patient Position: Sitting, BP Method: Medium (Automatic))   Pulse 98   Ht 5' 2" (1.575 m)   Wt 68.9 kg (152 lb)   SpO2 99%   BMI 27.80 kg/m²     Assessment:       ICD-10-CM ICD-9-CM   1. Postoperative hypothyroidism  E89.0 244.0   2. Wellness examination  Z00.00 V70.0       Plan:     Medication List with Changes/Refills   Current Medications    MULTIVITAMIN CAPSULE    Take 1 capsule by mouth once daily.    SPIRONOLACTONE (ALDACTONE) 100 MG TABLET    TAKE 1 TABLET(100 MG) BY MOUTH EVERY DAY   Changed and/or Refilled Medications    Modified Medication Previous Medication    LEVOTHYROXINE (SYNTHROID) 75 MCG TABLET levothyroxine (SYNTHROID) 75 MCG tablet       TAKE 1 TABLET(75 MCG) BY MOUTH DAILY BEFORE BREAKFAST    TAKE 1 TABLET(75 MCG) BY MOUTH DAILY BEFORE BREAKFAST   Discontinued Medications    FLUCELVAX QUAD 5832-4549, PF, 60 MCG (15 MCG X 4)/0.5 ML SYRG    ADM 0.5ML IM UTD    SPIRONOLACTONE (ALDACTONE) 100 MG TABLET    TAKE 1 TABLET(100 MG) BY MOUTH EVERY DAY        1. Postoperative hypothyroidism  -     levothyroxine (SYNTHROID) 75 MCG tablet; TAKE 1 TABLET(75 MCG) BY MOUTH DAILY BEFORE BREAKFAST  Dispense: 30 tablet; Refill: 11  -     TSH; Future; Expected date: 04/02/2024  -     T4, Free; Future; Expected date: 04/02/2024  -     " Basic Metabolic Panel; Future; Expected date: 04/04/2024    2. Wellness examination  -     CBC Auto Differential; Future; Expected date: 04/02/2024  -     Comprehensive Metabolic Panel; Future; Expected date: 04/02/2024  -     Lipid Panel; Future; Expected date: 04/02/2024    Other orders  -     TSH+Free T4       Hyperkalemia noted on labs I believe it is an error but I will have it repeated at the hospital or path lab      Future Appointments   Date Time Provider Department Center   6/4/2024 11:00 AM Ena Villalta MD [facility] ACCENDO ACC   10/1/2024  1:20 PM Charmaine Naranjo MD [facility] PRICG5 [facility]   12/19/2024 10:00 AM Yamilex Sanchez MD [facility] [id number] [facility]

## 2024-04-03 LAB
ABS NRBC COUNT: 0 THOU/UL (ref 0–0.01)
ABSOLUTE BASOPHIL: 0.1 10*3/UL (ref 0–0.3)
ABSOLUTE EOSINOPHIL: 0.1 10*3/UL (ref 0–0.6)
ABSOLUTE IMMATURE GRAN: 0.04 THOU/UL (ref 0–0.03)
ABSOLUTE LYMPHOCYTE: 2 10*3/UL (ref 1.2–4)
ABSOLUTE MONOCYTE: 0.8 10*3/UL (ref 0.1–0.8)
ALBUMIN SERPL BCP-MCNC: 4 G/DL (ref 3.4–5)
ALP SERPL-CCNC: 91 U/L (ref 45–117)
ALT SERPL W P-5'-P-CCNC: 17 U/L (ref 13–56)
ANION GAP SERPL CALC-SCNC: 5 MMOL/L (ref 3–11)
AST SERPL-CCNC: 17 U/L (ref 15–37)
BASOPHILS NFR BLD: 0.4 % (ref 0–3)
BILIRUB SERPL-MCNC: 0.3 MG/DL (ref 0.2–1)
BUN SERPL-MCNC: 6 MG/DL (ref 7–18)
BUN/CREAT SERPL: 7.89 RATIO
CALCIUM SERPL-MCNC: 9.5 MG/DL (ref 8.5–10.1)
CHLORIDE SERPL-SCNC: 105 MMOL/L (ref 98–107)
CHOLEST SERPL-MSCNC: 192 MG/DL
CO2 SERPL-SCNC: 26 MMOL/L (ref 21–32)
CREAT SERPL-MCNC: 0.76 MG/DL (ref 0.55–1.02)
EOSINOPHIL NFR BLD: 0.7 % (ref 0–6)
ERYTHROCYTE [DISTWIDTH] IN BLOOD BY AUTOMATED COUNT: 12.2 % (ref 0–15.5)
GFR ESTIMATION: > 60
GLUCOSE SERPL-MCNC: 91 MG/DL (ref 74–106)
HCT VFR BLD AUTO: 43.7 % (ref 37–47)
HDLC SERPL-MCNC: 55 MG/DL
HGB BLD-MCNC: 14.3 G/DL (ref 12–16)
IMMATURE GRANULOCYTES: 0.3 % (ref 0–0.43)
IMMATURE PLATELET FRACTION: 9.7 % (ref 0–8.6)
LDLC SERPL CALC-MCNC: 111 MG/DL
LYMPHOCYTES NFR BLD: 16.2 % (ref 20–45)
MCH RBC QN AUTO: 29.9 PG (ref 27–32)
MCHC RBC AUTO-ENTMCNC: 32.7 % (ref 32–36)
MCV RBC AUTO: 91.2 FL (ref 80–99)
MONOCYTES NFR BLD: 6.9 % (ref 2–10)
NEUTROPHILS # BLD AUTO: 9.1 10*3/UL (ref 1.4–7)
NEUTROPHILS NFR BLD: 75.5 % (ref 50–80)
NUCLEATED RED BLOOD CELLS: 0 % (ref 0–0.2)
PLATELETS: 271 10*3/UL (ref 130–400)
PMV BLD AUTO: 13.2 FL (ref 9.2–12.2)
POTASSIUM SERPL-SCNC: 5.8 MMOL/L (ref 3.5–5.1)
PROT SERPL-MCNC: 7.4 G/DL (ref 6.4–8.2)
RBC # BLD AUTO: 4.79 10*6/UL (ref 4.2–5.4)
SODIUM BLD-SCNC: 136 MMOL/L (ref 131–143)
T4 FREE SP9 P CHAL SERPL-SCNC: 1.06 NG/DL (ref 0.76–1.46)
TRIGL SERPL-MCNC: 130 MG/DL (ref 0–149)
TSH SERPL DL<=0.005 MIU/L-ACNC: 1.44 UIU/ML (ref 0.36–3.74)
VLDL CHOLESTEROL: 26 MG/DL
WBC # BLD: 12 10*3/UL (ref 4.5–10)

## 2024-04-09 ENCOUNTER — PATIENT MESSAGE (OUTPATIENT)
Dept: PRIMARY CARE CLINIC | Facility: CLINIC | Age: 49
End: 2024-04-09
Payer: COMMERCIAL

## 2024-10-01 ENCOUNTER — OFFICE VISIT (OUTPATIENT)
Dept: PRIMARY CARE CLINIC | Facility: CLINIC | Age: 49
End: 2024-10-01
Payer: COMMERCIAL

## 2024-10-01 VITALS
SYSTOLIC BLOOD PRESSURE: 126 MMHG | HEART RATE: 70 BPM | OXYGEN SATURATION: 99 % | WEIGHT: 149.38 LBS | DIASTOLIC BLOOD PRESSURE: 74 MMHG | HEIGHT: 62 IN | BODY MASS INDEX: 27.49 KG/M2

## 2024-10-01 DIAGNOSIS — E28.2 PCOD (POLYCYSTIC OVARIAN DISEASE): Primary | ICD-10-CM

## 2024-10-01 DIAGNOSIS — D72.823 LEUKEMOID REACTION: ICD-10-CM

## 2024-10-01 DIAGNOSIS — E89.0 POSTOPERATIVE HYPOTHYROIDISM: ICD-10-CM

## 2024-10-01 PROCEDURE — 1159F MED LIST DOCD IN RCRD: CPT | Mod: CPTII,,, | Performed by: INTERNAL MEDICINE

## 2024-10-01 PROCEDURE — 3074F SYST BP LT 130 MM HG: CPT | Mod: CPTII,,, | Performed by: INTERNAL MEDICINE

## 2024-10-01 PROCEDURE — 3078F DIAST BP <80 MM HG: CPT | Mod: CPTII,,, | Performed by: INTERNAL MEDICINE

## 2024-10-01 PROCEDURE — 99214 OFFICE O/P EST MOD 30 MIN: CPT | Mod: S$PBB,,, | Performed by: INTERNAL MEDICINE

## 2024-10-01 PROCEDURE — 3008F BODY MASS INDEX DOCD: CPT | Mod: CPTII,,, | Performed by: INTERNAL MEDICINE

## 2024-10-01 RX ORDER — SPIRONOLACTONE 100 MG/1
100 TABLET, FILM COATED ORAL DAILY
Qty: 90 TABLET | Refills: 3 | Status: SHIPPED | OUTPATIENT
Start: 2024-10-01

## 2024-10-01 NOTE — PROGRESS NOTES
Subjective:      Patient ID: Alla Anguiano is a 49 y.o. female.    Chief Complaint: Follow-up    HPI    Past Medical History:   Diagnosis Date    Allergies     Elevated WBC count     Excess body and facial hair     Nodule of right lobe of thyroid gland 2020    PCOS (polycystic ovarian syndrome)        Patient here for follow up. She has a h/o hemithyroidectomy and her levels are stable on current dose. She sees an Endocrinologist in [place] yearly   She denies any acute complaints  She denies sexual activity  Has chronic elevated white blood cell count and has had extensive workup by a hematologist in the past  She is on spironolactone for excess body and facial hair  She is established with Dr. Clifton who did a biopsy of a mass on her breast it is benign however she is keeping an eye on it  Dr Yamilex Rehman is her gynecologist     Review of Systems   Constitutional:  Negative for chills and fever.   HENT:  Negative for hearing loss.    Eyes:  Negative for blurred vision.   Respiratory:  Negative for cough, shortness of breath and wheezing.    Cardiovascular:  Negative for chest pain, palpitations and leg swelling.   Gastrointestinal:  Negative for abdominal pain, blood in stool, constipation, diarrhea, melena, nausea and vomiting.   Genitourinary:  Negative for dysuria, frequency and urgency.   Musculoskeletal:  Negative for falls.   Skin:  Negative for rash.   Neurological:  Negative for dizziness and headaches.   Endo/Heme/Allergies:  Does not bruise/bleed easily.   Psychiatric/Behavioral:  Negative for depression. The patient is not nervous/anxious.      Objective:     Physical Exam  Vitals reviewed.   Constitutional:       Appearance: Normal appearance.   HENT:      Head: Normocephalic.      Mouth/Throat:      Mouth: Mucous membranes are moist.      Pharynx: Oropharynx is clear.   Eyes:      Extraocular Movements: Extraocular movements intact.      Conjunctiva/sclera: Conjunctivae normal.       Pupils: Pupils are equal, round, and reactive to light.   Cardiovascular:      Rate and Rhythm: Normal rate and regular rhythm.   Pulmonary:      Effort: Pulmonary effort is normal.      Breath sounds: Normal breath sounds.   Abdominal:      General: Bowel sounds are normal.   Musculoskeletal:      Right lower leg: No edema.      Left lower leg: No edema.   Skin:     General: Skin is warm.      Capillary Refill: Capillary refill takes less than 2 seconds.   Neurological:      Mental Status: She is alert and oriented to person, place, and time.   Psychiatric:         Mood and Affect: Mood normal.       Assessment:       ICD-10-CM ICD-9-CM   1. PCOD (polycystic ovarian disease)  E28.2 256.4   2. Postoperative hypothyroidism  E89.0 244.0   3. Leukemoid reaction  D72.823 288.62       Plan:     Medication List with Changes/Refills   Current Medications    LEVOTHYROXINE (SYNTHROID) 75 MCG TABLET    TAKE 1 TABLET(75 MCG) BY MOUTH DAILY BEFORE BREAKFAST    MULTIVITAMIN CAPSULE    Take 1 capsule by mouth once daily.   Changed and/or Refilled Medications    Modified Medication Previous Medication    SPIRONOLACTONE (ALDACTONE) 100 MG TABLET spironolactone (ALDACTONE) 100 MG tablet       Take 1 tablet (100 mg total) by mouth once daily.    TAKE 1 TABLET(100 MG) BY MOUTH EVERY DAY        1. PCOD (polycystic ovarian disease)  -     spironolactone (ALDACTONE) 100 MG tablet; Take 1 tablet (100 mg total) by mouth once daily.  Dispense: 90 tablet; Refill: 3  -     Basic Metabolic Panel; Future; Expected date: 10/03/2024    2. Postoperative hypothyroidism  -     TSH; Future; Expected date: 10/03/2024  -     T4, Free; Future; Expected date: 10/03/2024    3. Leukemoid reaction  -CBC         Labs before next visit       Future Appointments   Date Time Provider Department Center   12/19/2024 10:00 AM Yamilex Sanchez MD [facility] [id number] [facility]   4/1/2025  1:40 PM Charmaine Naranjo MD [facility] PR[facility]G5 [facility]   6/17/2025 11:20 AM Amabr  Ena KHOURY MD [facility] ACCENDO ACC

## 2024-12-09 ENCOUNTER — PATIENT MESSAGE (OUTPATIENT)
Dept: OBSTETRICS AND GYNECOLOGY | Facility: CLINIC | Age: 49
End: 2024-12-09
Payer: COMMERCIAL

## 2025-02-04 ENCOUNTER — OFFICE VISIT (OUTPATIENT)
Dept: OBSTETRICS AND GYNECOLOGY | Facility: CLINIC | Age: 50
End: 2025-02-04
Payer: COMMERCIAL

## 2025-02-04 VITALS
DIASTOLIC BLOOD PRESSURE: 81 MMHG | BODY MASS INDEX: 26.48 KG/M2 | SYSTOLIC BLOOD PRESSURE: 136 MMHG | WEIGHT: 144.81 LBS

## 2025-02-04 DIAGNOSIS — Z01.419 ENCOUNTER FOR WELL WOMAN EXAM WITH ROUTINE GYNECOLOGICAL EXAM: Primary | ICD-10-CM

## 2025-02-04 DIAGNOSIS — Z12.31 BREAST CANCER SCREENING BY MAMMOGRAM: ICD-10-CM

## 2025-02-04 PROCEDURE — 3008F BODY MASS INDEX DOCD: CPT | Mod: CPTII,,, | Performed by: OBSTETRICS & GYNECOLOGY

## 2025-02-04 PROCEDURE — 99396 PREV VISIT EST AGE 40-64: CPT | Mod: S$PBB,,, | Performed by: OBSTETRICS & GYNECOLOGY

## 2025-02-04 PROCEDURE — 3075F SYST BP GE 130 - 139MM HG: CPT | Mod: CPTII,,, | Performed by: OBSTETRICS & GYNECOLOGY

## 2025-02-04 PROCEDURE — 1159F MED LIST DOCD IN RCRD: CPT | Mod: CPTII,,, | Performed by: OBSTETRICS & GYNECOLOGY

## 2025-02-04 PROCEDURE — 3079F DIAST BP 80-89 MM HG: CPT | Mod: CPTII,,, | Performed by: OBSTETRICS & GYNECOLOGY

## 2025-02-04 NOTE — PROGRESS NOTES
CC: WELL WOMAN (age 45-59)-perimenopausal  Patient Care Team:  Charmaine Naranjo MD as PCP - General (Pediatrics)  Ama Clifton MD as Consulting Physician (General Surgery)        HPI:  Patient is a 49 y.o. who presents for her well woman exam today.  History reviewed with patient. Periods sometimes irregular but light  Patient is without complaints or concerns today.     CONTRACEPTION:  none  HX ABNL PAPS: none    REVIEW OF PRIOR DATA/ HEALTH MAINTENANCE:  LAST ANNUAL:   2023   LAST MMG- 2024-  [facility]    LAST COLONOSCOPY- - dr patel - niall 10 yrs (neg)        Past Medical History:   Diagnosis Date    Allergies     Elevated WBC count     Excess body and facial hair     Nodule of right lobe of thyroid gland     PCOS (polycystic ovarian syndrome)      SURGICAL HX:   has a past surgical history that includes Eye surgery; Mouth surgery; Tympanostomy tube placement; Biopsy of thyroid (); Adenoidectomy (); Tonsillectomy (); Thyroid lobectomy (Right, 2021); and Colonoscopy (2022).    SOCIAL HX:    reports that she has never smoked. She has never used smokeless tobacco. She reports that she does not currently use alcohol. She reports that she does not use drugs.    Current Outpatient Medications   Medication Instructions    levothyroxine (SYNTHROID) 75 MCG tablet TAKE 1 TABLET(75 MCG) BY MOUTH DAILY BEFORE BREAKFAST    multivitamin capsule 1 capsule, Daily    spironolactone (ALDACTONE) 100 mg, Oral, Daily     VITALS:  Blood pressure 136/81, weight 65.7 kg (144 lb 12.8 oz), last menstrual period 2025.  Body mass index is 26.48 kg/m².     PHYSICAL EXAM-  APPEARANCE: Well appearing, in no acute distress.  CV/ PULM: no resp distress, normal resp effort  PSYCH: Normal mood and affect, cooperative  SKIN: No rashes, lesions, or abnormal bruising  ABD: Soft, no masses, tenderness, or distension  BREASTS: No skin changes,nipple dc. No palpable masses or  tenderness  PELVIC:  VULVA: Normal female genitalia. No lesions  BLADDER: No suprapubic tenderness  VAGINA/ CVX:  No lesions, no d/c  UTERUS: mobile, non-tender  ADNEXA: No masses, tenderness, or fullness.  ANUS/ PERINEUM: Normal tone.  No lesions.           *patient verbally consented for exam and female chaperone present for entire exam    ASSESSMENT and PLAN:  Encounter for well woman exam with routine gynecological exam  -     Liquid-based pap smear, screening    Breast cancer screening by mammogram  -     Mammo Digital Screening Bilat w/ Vincenzo; Future; Expected date: 02/17/2025       FOLLOWUP:  1 year for wwe or sooner prn    COUNSELING:  Patient was counseled today on recommendation for yearly pelvic exam, current Pap guidelines, self breast exams, contraception, recommendations for annual screening mammograms, and routine screening colonoscopy at age 45.  Encouraged patient to see her PCP for other health maintenance.

## 2025-02-11 LAB — Lab: NORMAL

## 2025-04-01 ENCOUNTER — OFFICE VISIT (OUTPATIENT)
Dept: PRIMARY CARE CLINIC | Facility: CLINIC | Age: 50
End: 2025-04-01
Payer: COMMERCIAL

## 2025-04-01 ENCOUNTER — CLINICAL SUPPORT (OUTPATIENT)
Dept: OBSTETRICS AND GYNECOLOGY | Facility: CLINIC | Age: 50
End: 2025-04-01
Payer: COMMERCIAL

## 2025-04-01 VITALS
BODY MASS INDEX: 27.35 KG/M2 | OXYGEN SATURATION: 99 % | HEART RATE: 88 BPM | SYSTOLIC BLOOD PRESSURE: 131 MMHG | WEIGHT: 148.63 LBS | HEIGHT: 62 IN | RESPIRATION RATE: 18 BRPM | DIASTOLIC BLOOD PRESSURE: 73 MMHG

## 2025-04-01 DIAGNOSIS — Z01.89 ROUTINE LAB DRAW: Primary | ICD-10-CM

## 2025-04-01 DIAGNOSIS — E89.0 POSTOPERATIVE HYPOTHYROIDISM: ICD-10-CM

## 2025-04-01 DIAGNOSIS — D72.823 LEUKEMOID REACTION: Primary | ICD-10-CM

## 2025-04-01 PROCEDURE — 1159F MED LIST DOCD IN RCRD: CPT | Mod: CPTII,,, | Performed by: INTERNAL MEDICINE

## 2025-04-01 PROCEDURE — 3075F SYST BP GE 130 - 139MM HG: CPT | Mod: CPTII,,, | Performed by: INTERNAL MEDICINE

## 2025-04-01 PROCEDURE — 3078F DIAST BP <80 MM HG: CPT | Mod: CPTII,,, | Performed by: INTERNAL MEDICINE

## 2025-04-01 PROCEDURE — 99214 OFFICE O/P EST MOD 30 MIN: CPT | Mod: S$PBB,,, | Performed by: INTERNAL MEDICINE

## 2025-04-01 PROCEDURE — 3008F BODY MASS INDEX DOCD: CPT | Mod: CPTII,,, | Performed by: INTERNAL MEDICINE

## 2025-04-01 NOTE — PROGRESS NOTES
Subjective:      Patient ID: Alla Anguiano is a 49 y.o. female.    Chief Complaint: Follow-up (6 Month F/U)    HPI    Past Medical History:   Diagnosis Date    Allergies     Elevated WBC count     Excess body and facial hair     Nodule of right lobe of thyroid gland 2020    PCOS (polycystic ovarian syndrome)        Patient here for follow up. She has a h/o hemithyroidectomy and her levels are stable on current dose. She sees an Endocrinologist in [place] yearly   She denies any acute complaints  She denies sexual activity  Has chronic elevated white blood cell count and has had extensive workup by a hematologist in the past  She is on spironolactone for excess body and facial hair  She is established with Dr. Clifton who did a biopsy of a mass on her breast it is benign however she is keeping an eye on it  Dr Yamilex Rehman is her gynecologist     Review of Systems   Constitutional:  Negative for chills and fever.   HENT:  Negative for hearing loss.    Eyes:  Negative for blurred vision.   Respiratory:  Negative for cough, shortness of breath and wheezing.    Cardiovascular:  Negative for chest pain, palpitations and leg swelling.   Gastrointestinal:  Negative for abdominal pain, blood in stool, constipation, diarrhea, melena, nausea and vomiting.   Genitourinary:  Negative for dysuria, frequency and urgency.   Musculoskeletal:  Negative for falls.   Skin:  Negative for rash.   Neurological:  Negative for dizziness and headaches.   Endo/Heme/Allergies:  Does not bruise/bleed easily.   Psychiatric/Behavioral:  Negative for depression. The patient is not nervous/anxious.      Objective:     Physical Exam  Vitals reviewed.   Constitutional:       Appearance: Normal appearance.   HENT:      Head: Normocephalic.      Mouth/Throat:      Mouth: Mucous membranes are moist.      Pharynx: Oropharynx is clear.   Eyes:      Extraocular Movements: Extraocular movements intact.      Conjunctiva/sclera: Conjunctivae  "normal.      Pupils: Pupils are equal, round, and reactive to light.   Cardiovascular:      Rate and Rhythm: Normal rate and regular rhythm.   Pulmonary:      Effort: Pulmonary effort is normal.      Breath sounds: Normal breath sounds.   Abdominal:      General: Bowel sounds are normal.   Musculoskeletal:      Right lower leg: No edema.      Left lower leg: No edema.   Skin:     General: Skin is warm.      Capillary Refill: Capillary refill takes less than 2 seconds.   Neurological:      Mental Status: She is alert and oriented to person, place, and time.   Psychiatric:         Mood and Affect: Mood normal.       /73 (BP Location: Right arm, Patient Position: Sitting)   Pulse 88   Resp 18   Ht 5' 2" (1.575 m)   Wt 67.4 kg (148 lb 9.6 oz)   SpO2 99%   BMI 27.18 kg/m²     Assessment:       ICD-10-CM ICD-9-CM   1. Leukemoid reaction  D72.823 288.62   2. Postoperative hypothyroidism  E89.0 244.0       Plan:     Medication List with Changes/Refills   Current Medications    LEVOTHYROXINE (SYNTHROID) 75 MCG TABLET    TAKE 1 TABLET(75 MCG) BY MOUTH DAILY BEFORE BREAKFAST    MULTIVITAMIN CAPSULE    Take 1 capsule by mouth once daily.    SPIRONOLACTONE (ALDACTONE) 100 MG TABLET    Take 1 tablet (100 mg total) by mouth once daily.        1. Leukemoid reaction  Overview:    - persistent leukocytosis over last few months  - today WCC 14,190, granulocytes 77% and lymphocytes 15%  - as there is no elevated hemoglobin and no thrombocytopenia not likely AUGUST 2 mutation  - will check BCR-ABL mutation.  - Peripheral smear to be checked  - 2 wks follow up for MD visit and labs    Orders:  -     CBC Auto Differential; Future; Expected date: 04/02/2025  -     Comprehensive Metabolic Panel; Future; Expected date: 04/02/2025    2. Postoperative hypothyroidism  -     TSH; Future; Expected date: 04/02/2025  -     T4, Free; Future; Expected date: 04/02/2025           Future Appointments   Date Time Provider Department Center "   6/17/2025 11:20 AM Ena Villalta MD [facility] ACCENDO ACC   10/7/2025  1:30 PM Charmaine Naranjo MD [facility] PRICG5 [facility]   2/17/2026  3:15 PM Yamilex Sanchez MD [facility] OBGYG3  [place]

## 2025-04-09 ENCOUNTER — PATIENT MESSAGE (OUTPATIENT)
Dept: PRIMARY CARE CLINIC | Facility: CLINIC | Age: 50
End: 2025-04-09
Payer: COMMERCIAL

## 2025-04-15 DIAGNOSIS — E89.0 POSTOPERATIVE HYPOTHYROIDISM: ICD-10-CM

## 2025-04-15 RX ORDER — LEVOTHYROXINE SODIUM 75 UG/1
TABLET ORAL
Qty: 30 TABLET | Refills: 11 | Status: SHIPPED | OUTPATIENT
Start: 2025-04-15

## 2025-04-15 NOTE — PROGRESS NOTES
CC: Well Woman (age 40+ NOT menopausal)    HPI:  Patient is a 44 y.o. G0  who presents for her well woman exam today.  History reviewed with patient and changes noted. Patient is without complaints or concerns today. Patient doing well and doing her best to avoid getting     Past Medical History:   Diagnosis Date    Excess body and facial hair        Past Surgical History:   Procedure Laterality Date    EYE SURGERY      MOUTH SURGERY      TYMPANOSTOMY TUBE PLACEMENT         Social History     Tobacco Use    Smoking status: Never Smoker   Substance Use Topics    Alcohol use: Yes     Frequency: Monthly or less     Drinks per session: 1 or 2     Binge frequency: Never    Drug use: Never       Family History   Problem Relation Age of Onset    Colon cancer Paternal Grandfather     Breast cancer Maternal Grandmother     Pacemaker/defibrilator Father     Eating disorder Mother        Review of patient's allergies indicates:  No Known Allergies      Current Outpatient Medications:     spironolactone (ALDACTONE) 100 MG tablet, , Disp: , Rfl:     OB History        0    Para   0    Term   0       0    AB   0    Living   0       SAB   0    TAB   0    Ectopic   0    Multiple   0    Live Births   0                  GYN HX:    HX ABNL PAPS:  no    HX OF STDS:  none    GARDASIL: no- declines     CONTRACEPTION: ocps    HEALTH MAINTENANCE:  (PCP-Primary Doctor No)    LAST ANNUAL / PAP:   2019  PAPresult: neg    LAST MMG:  2019  neg at [facility]     LAST LABS: within the last year Dr Jude Cottrell (endocrinology in [facility])    Review of Systems   Constitutional: Negative for activity change, appetite change, chills, fatigue, fever and unexpected weight change.   Respiratory: Negative for cough and shortness of breath.    Cardiovascular: Negative for chest pain and leg swelling.   Gastrointestinal: Negative for abdominal pain, bloating, blood in stool, constipation, diarrhea, nausea and  "vomiting.   Endocrine: Negative for hair loss and hot flashes.   Genitourinary: Negative for decreased libido, dysmenorrhea, dyspareunia, dysuria, flank pain, frequency, genital sores, hematuria, menorrhagia, pelvic pain, urgency, vaginal bleeding, vaginal discharge, vaginal pain, urinary incontinence, postcoital bleeding and vaginal odor.   Musculoskeletal: Negative for arthralgias and joint swelling.   Integumentary:  Negative for rash, acne, mole/lesion, breast mass, nipple discharge, breast skin changes and breast tenderness.   Neurological: Negative for headaches.   Hematological: Does not bruise/bleed easily.   Psychiatric/Behavioral: Negative for depression and sleep disturbance. The patient is not nervous/anxious.    Breast: Negative for asymmetry, lump, mass, nipple discharge, skin changes and tenderness      VITALS:  Patient's last menstrual period was 06/05/2020 (exact date).  Vitals:    07/15/20 1556   BP: 121/72   BP Location: Right arm   Patient Position: Sitting   BP Method: Medium (Automatic)   Weight: 64.4 kg (142 lb)   Height: 5' 2" (1.575 m)     Body mass index is 25.97 kg/m².     PHYSICAL EXAM:  Physical Exam:   Constitutional: She is oriented to person, place, and time. She appears well-developed and well-nourished. No distress.    HENT:   Head: Normocephalic and atraumatic.     Neck: No thyroid mass present.    Cardiovascular: Normal rate and normal pulses.     Pulmonary/Chest: Effort normal. No respiratory distress. She exhibits no deformity. Right breast exhibits no inverted nipple, no mass, no nipple discharge, no skin change, no tenderness, no bleeding and no swelling. Left breast exhibits no inverted nipple, no mass, no nipple discharge, no skin change, no tenderness, no bleeding and no swelling. Breasts are symmetrical.        Abdominal: Soft. Normal appearance. She exhibits no distension. There is no abdominal tenderness.     Genitourinary:    Vagina and uterus normal.      Pelvic exam " Detail Level: Zone was performed with patient supine.   There is no rash, tenderness, lesion or injury on the right labia. There is no rash, tenderness, lesion or injury on the left labia. Cervix is normal. Right adnexum displays no mass, no tenderness and no fullness. Left adnexum displays no mass, no tenderness and no fullness. No rectocele, cystocele or unspecified prolapse of vaginal walls in the vagina. Labial bartholins normal.negative for vaginal discharge          Musculoskeletal: Moves all extremeties.       Neurological: She is alert and oriented to person, place, and time.    Skin: Skin is warm and dry. No lesion and no rash noted.    Psychiatric: She has a normal mood and affect. Her speech is normal and behavior is normal. Judgment and thought content normal.     *female chaperone present for exam    ASSESSMENT and PLAN:  Encounter for well woman exam with routine gynecological exam  -     Liquid-based pap smear, screening    Breast cancer screening by mammogram  -     Mammo Digital Screening Bilat w/ Vincenzo; Future; Expected date: 08/15/2020         FOLLOWUP:  Follow up in about 1 year (around 7/15/2021).     COUNSELING:  Patient was counseled today on A.C.S. Pap guidelines and recommendations for yearly pelvic exam, monthly self breast exams, contraception, annual mammograms, and screening colonoscopy starting at age 50. Encouraged patient to see her PCP for other health maintenance.

## 2025-06-03 ENCOUNTER — TELEPHONE (OUTPATIENT)
Dept: OBSTETRICS AND GYNECOLOGY | Facility: CLINIC | Age: 50
End: 2025-06-03
Payer: COMMERCIAL

## 2025-06-03 ENCOUNTER — RESULTS FOLLOW-UP (OUTPATIENT)
Dept: OBSTETRICS AND GYNECOLOGY | Facility: CLINIC | Age: 50
End: 2025-06-03

## 2025-06-03 DIAGNOSIS — Z91.89 AT HIGH RISK FOR BREAST CANCER: Primary | ICD-10-CM

## 2025-06-03 NOTE — PROGRESS NOTES
"Please let pt know her mmg is normal but they calculated her ltr as 27% and her breast tissue is "extremely dense" and she meets criteria to do breast mri. Ill place order and she can do that if she wants  "

## 2025-06-04 ENCOUNTER — TELEPHONE (OUTPATIENT)
Dept: OBSTETRICS AND GYNECOLOGY | Facility: CLINIC | Age: 50
End: 2025-06-04
Payer: COMMERCIAL

## 2025-06-17 PROBLEM — E89.0 POSTOPERATIVE HYPOTHYROIDISM: Status: ACTIVE | Noted: 2025-06-17
